# Patient Record
Sex: FEMALE | Race: WHITE
[De-identification: names, ages, dates, MRNs, and addresses within clinical notes are randomized per-mention and may not be internally consistent; named-entity substitution may affect disease eponyms.]

---

## 2018-03-03 ENCOUNTER — HOSPITAL ENCOUNTER (EMERGENCY)
Dept: HOSPITAL 43 - DL.ED | Age: 73
Discharge: HOME | End: 2018-03-03
Payer: MEDICARE

## 2018-03-03 VITALS — DIASTOLIC BLOOD PRESSURE: 66 MMHG | SYSTOLIC BLOOD PRESSURE: 138 MMHG

## 2018-03-03 DIAGNOSIS — A08.4: Primary | ICD-10-CM

## 2018-03-03 DIAGNOSIS — Z79.899: ICD-10-CM

## 2018-03-03 DIAGNOSIS — Z79.02: ICD-10-CM

## 2018-03-03 DIAGNOSIS — Z87.891: ICD-10-CM

## 2018-03-03 DIAGNOSIS — Z79.82: ICD-10-CM

## 2018-03-03 DIAGNOSIS — J44.9: ICD-10-CM

## 2018-03-03 LAB
CHLORIDE SERPL-SCNC: 102 MMOL/L (ref 101–111)
SODIUM SERPL-SCNC: 136 MMOL/L (ref 135–145)

## 2018-03-03 PROCEDURE — 96360 HYDRATION IV INFUSION INIT: CPT

## 2018-03-03 PROCEDURE — 87804 INFLUENZA ASSAY W/OPTIC: CPT

## 2018-03-03 PROCEDURE — 84484 ASSAY OF TROPONIN QUANT: CPT

## 2018-03-03 PROCEDURE — 85025 COMPLETE CBC W/AUTO DIFF WBC: CPT

## 2018-03-03 PROCEDURE — 93005 ELECTROCARDIOGRAM TRACING: CPT

## 2018-03-03 PROCEDURE — 99284 EMERGENCY DEPT VISIT MOD MDM: CPT

## 2018-03-03 PROCEDURE — 36415 COLL VENOUS BLD VENIPUNCTURE: CPT

## 2018-03-03 PROCEDURE — 71045 X-RAY EXAM CHEST 1 VIEW: CPT

## 2018-03-03 PROCEDURE — 80053 COMPREHEN METABOLIC PANEL: CPT

## 2018-03-03 NOTE — EDM.PDOC
ED HPI GENERAL MEDICAL PROBLEM





- General


Chief Complaint: General


Stated Complaint: DIZZY,LIGHTHEADED,NAUSATED   [id number]


Time Seen by Provider: 03/03/18 20:10


Source of Information: Reports: Patient


History Limitations: Reports: No Limitations





- History of Present Illness


INITIAL COMMENTS - FREE TEXT/NARRATIVE: 





This 73 yo female patient reports to the ED with a 1 day history of nausea, 

dizziness and decreased appetite. The patient reports she has not vomited and 

has not had diarrhea. The patient took Tylenol prior to coming to the ED, but 

does not believe she had an elevated temp. 


Onset: Today


Duration: Constant


Location: Reports: Generalized


Quality: Reports: Other


Severity: Moderate


Improves with: Reports: None


Worsens with: Reports: None


Associated Symptoms: Reports: Nausea/Vomiting (nausea no vomiting), Other (

dizziness)


Treatments PTA: Reports: Acetaminophen





- Related Data


 Allergies











Allergy/AdvReac Type Severity Reaction Status Date / Time


 


No Known Allergies Allergy   Verified 03/03/18 19:55











Home Meds: 


 Home Meds





Atenolol [Tenormin] 25 mg PO DAILY 02/27/15 [History]


Calcium Carbonate/Vitamin D3 [Calcium 600 + Vit D Tablet] 1 each PO DAILY 02/27/

15 [History]


Diclofenac Sodium [Voltaren] 75 mg PO DAILY 02/27/15 [History]


Glucosamine [Glucosamine Sulfate] 500 mg PO DAILY 02/27/15 [History]


Losartan [Cozaar] 100 mg PO DAILY 02/27/15 [History]


amLODIPine Besylate [Amlodipine Besylate] 5 mg PO DAILY 02/27/15 [History]


Ciprofloxacin HCl [Cipro] 250 mg PO BID 09/01/15 [History]


cycloSPORINE [Restasis] 1 drop EYEBOTH BID 03/14/17 [History]


Tiotropium [Spiriva HandiHaler] 1 spray INH DAILY 03/15/17 [History]


Aspirin [Ecotrin] 81 mg PO DAILY 04/20/17 [History]


Clopidogrel [Plavix] 75 mg PO DAILY 04/20/17 [History]


atorvaSTATin [Lipitor] 20 mg PO BEDTIME 04/20/17 [History]


Gabapentin [Neurontin] 100 mg PO TID 03/03/18 [History]


Omeprazole [Omeprazole] 20 mg PO DAILY 03/03/18 [History]


Simvastatin [Zocor] 10 mg PO BEDTIME 03/03/18 [History]











Past Medical History


HEENT History: Reports: Impaired Vision


Other HEENT History: wears glasses


Cardiovascular History: Reports: PTCA, Stents


Other Cardiovascular History: April,2017


Respiratory History: Reports: COPD


Other Respiratory History: uses c-pap


Gastrointestinal History: Reports: Irritable Bowel Syndrome


Genitourinary History: Reports: None


Musculoskeletal History: Reports: Arthritis


Other Musculoskeletal History: bad disc.  Left hip arthritis





Social & Family History





- Tobacco Use


Smoking Status *Q: Never Smoker


Years of Tobacco use: 40


Used Tobacco, but Quit: Yes


Month Tobacco Last Used: 17 years ago


Second Hand Smoke Exposure: No





- Caffeine Use


Caffeine Use: Reports: Coffee, Soda


Caffeine Use Comment: decaf





- Alcohol Use


Days Per Week of Alcohol Use: 0





- Recreational Drug Use


Recreational Drug Use: No





ED ROS GENERAL





- Review of Systems


Review Of Systems: ROS reveals no pertinent complaints other than HPI.





ED EXAM, GENERAL





- Physical Exam


Exam: See Below


Exam Limited By: No Limitations


General Appearance: Alert, WD/WN, Mild Distress


Eye Exam: Bilateral Eye: EOMI, Normal Inspection, PERRL


Ears: Normal External Exam, Normal Canal, Hearing Grossly Normal, Normal TMs


Nose: Normal Inspection, Normal Mucosa, No Blood


Throat/Mouth: Normal Inspection, Normal Lips, Normal Teeth, Normal Gums, Normal 

Oropharynx, Normal Voice, No Airway Compromise


Head: Atraumatic, Normocephalic


Neck: Normal Inspection, Supple, Non-Tender, Full Range of Motion


Respiratory/Chest: No Respiratory Distress, Lungs Clear, Normal Breath Sounds, 

No Accessory Muscle Use, Chest Non-Tender


Cardiovascular: Normal Peripheral Pulses, Regular Rate, Rhythm, No Edema, No 

Gallop, No JVD, No Murmur, No Rub


GI/Abdominal: Normal Bowel Sounds, Soft, Non-Tender, No Organomegaly, No 

Distention, No Abnormal Bruit, No Mass


 (Female) Exam: Deferred


Rectal (Female) Exam: Deferred


Back Exam: Normal Inspection, Full Range of Motion, NT


Extremities: Normal Inspection, Normal Range of Motion, Non-Tender, Normal 

Capillary Refill, No Pedal Edema


Neurological: Alert, Oriented, CN II-XII Intact, Normal Cognition, Normal Gait, 

Normal Reflexes, No Motor/Sensory Deficits


Psychiatric: Normal Affect, Normal Mood


Skin Exam: Warm, Dry, Intact, Normal Color, No Rash


Lymphatic: No Adenopathy





Course





- Vital Signs


Last Recorded V/S: 


 Last Vital Signs











Temp  35.9 C   03/03/18 19:47


 


Pulse  69   03/03/18 19:47


 


Resp  19   03/03/18 19:47


 


BP  157/85 H  03/03/18 19:47


 


Pulse Ox  97   03/03/18 19:47














- Orders/Labs/Meds


Orders: 


 Active Orders 24 hr











 Category Date Time Status


 


 EKG Documentation Completion [RC] URGENT Care  03/03/18 20:04 Active


 


 Sodium Chloride 0.9% [Normal Saline] 1,000 ml Med  03/03/18 20:04 Active





 IV .BOLUS   








 Medication Orders





Sodium Chloride (Normal Saline)  1,000 mls @ 500 mls/hr IV .BOLUS ONE


   Stop: 03/03/18 22:03


   Last Admin: 03/03/18 20:18  Dose: 500 mls/hr








Labs: 


 Laboratory Tests











  03/03/18 03/03/18 Range/Units





  19:50 19:50 


 


WBC  6.9   (5.0-10.0)  10^3/uL


 


RBC  4.94   (4.2-5.4)  10^6/uL


 


Hgb  14.4   (12.0-16.0)  g/dL


 


Hct  44.4   (37.0-47.0)  %


 


MCV  89.9   ()  fL


 


MCH  29.1   (27.0-34.0)  pg


 


MCHC  32.4 L   (33.0-35.0)  g/dL


 


Plt Count  152  D   (150-450)  10^3/uL


 


Neut % (Auto)  62.6   (42.2-75.2)  %


 


Lymph % (Auto)  26.4   (20.5-50.1)  %


 


Mono % (Auto)  8.7 H   (2-8)  %


 


Eos % (Auto)  2.0   (1.0-3.0)  %


 


Baso % (Auto)  0.3   (0.0-1.0)  %


 


Sodium   136  (135-145)  mmol/L


 


Potassium   4.2  (3.6-5.0)  mmol/L


 


Chloride   102  (101-111)  mmol/L


 


Carbon Dioxide   25.0  (21.0-31.0)  mmol/L


 


Anion Gap   13.2  


 


BUN   23 H  (7-18)  mg/dL


 


Creatinine   0.8  D  (0.6-1.3)  mg/dL


 


Est Cr Clr Drug Dosing   57.75  mL/min


 


Estimated GFR (MDRD)   > 60  


 


BUN/Creatinine Ratio   28.75  


 


Glucose   88  ()  mg/dL


 


Calcium   9.5  (8.4-10.2)  mg/dl


 


Total Bilirubin   0.7  (0.2-1.0)  mg/dL


 


AST   16  (10-42)  IU/L


 


ALT   14  (10-60)  IU/L


 


Alkaline Phosphatase   65  ()  IU/L


 


Troponin I   < 0.02  (0.00-0.02)  ng/ml


 


Total Protein   7.0  (6.7-8.2)  g/dl


 


Albumin   4.2  (3.2-5.5)  g/dl


 


Globulin   2.8  


 


Albumin/Globulin Ratio   1.50  











Meds: 


Medications











Generic Name Dose Route Start Last Admin





  Trade Name Freq  PRN Reason Stop Dose Admin


 


Sodium Chloride  1,000 mls @ 500 mls/hr  03/03/18 20:04  03/03/18 20:18





  Normal Saline  IV  03/03/18 22:03  500 mls/hr





  .BOLUS ONE   Administration














Departure





- Departure


Time of Disposition: 21:00


Disposition: Home, Self-Care 01


Condition: Fair


Clinical Impression: 


 Viral gastroenteritis








- Discharge Information


Instructions:  Viral Gastroenteritis, Adult


Forms:  ED Department Discharge


Care Plan Goals: 


The patient was advised of the examination, lab, x-ray and EKG results during 

the visit. The patient was given IV fluid while in the ED. The patient was 

encouraged to stick to a BRAT diet (bananas, rice, applesauce and toast) over 

the next 24-48 hours with small frequent sips of fluid. If the patient has any 

additional symptoms or concerns, the patient should either return to the 

emergency department or follow-up with her primary care facility. 





- My Orders


Last 24 Hours: 


My Active Orders





03/03/18 20:04


EKG Documentation Completion [RC] URGENT 


Sodium Chloride 0.9% [Normal Saline] 1,000 ml IV .BOLUS 














- Assessment/Plan


Last 24 Hours: 


My Active Orders





03/03/18 20:04


EKG Documentation Completion [RC] URGENT 


Sodium Chloride 0.9% [Normal Saline] 1,000 ml IV .BOLUS

## 2018-03-06 NOTE — EKG
03/03/2018 - HAYLIE HEARD -

 

TIME:  8:04 p.m.

 

FINDINGS:  EKG shows heart rate of 68 beats per minute, it is sinus rhythm with

some sinus arrhythmia.  Normal QRS complex.  Normal T-waves.  Normal ST

segments.

 

DD:  03/06/2018 10:27:32

DT:  03/06/2018 11:52:55

[facility]

Job #:  [id number]